# Patient Record
Sex: MALE | Race: WHITE | NOT HISPANIC OR LATINO | Employment: UNEMPLOYED | URBAN - METROPOLITAN AREA
[De-identification: names, ages, dates, MRNs, and addresses within clinical notes are randomized per-mention and may not be internally consistent; named-entity substitution may affect disease eponyms.]

---

## 2017-05-12 ENCOUNTER — HOSPITAL ENCOUNTER (EMERGENCY)
Facility: HOSPITAL | Age: 32
Discharge: HOME | End: 2017-05-12

## 2017-05-12 DIAGNOSIS — G89.29 CHRONIC BACK PAIN: ICD-10-CM

## 2017-05-12 DIAGNOSIS — M54.9 CHRONIC BACK PAIN: ICD-10-CM

## 2017-05-12 DIAGNOSIS — Z53.29 LEFT AGAINST MEDICAL ADVICE: Primary | ICD-10-CM

## 2017-05-12 DIAGNOSIS — M54.2 NECK PAIN: ICD-10-CM

## 2017-05-12 PROCEDURE — 99283 EMERGENCY DEPT VISIT LOW MDM: CPT

## 2017-11-07 ENCOUNTER — APPOINTMENT (EMERGENCY)
Dept: RADIOLOGY | Facility: HOSPITAL | Age: 32
End: 2017-11-07
Payer: COMMERCIAL

## 2017-11-07 ENCOUNTER — HOSPITAL ENCOUNTER (EMERGENCY)
Facility: HOSPITAL | Age: 32
Discharge: HOME | End: 2017-11-07
Attending: EMERGENCY MEDICINE
Payer: COMMERCIAL

## 2017-11-07 ENCOUNTER — APPOINTMENT (EMERGENCY)
Dept: CT IMAGING | Facility: HOSPITAL | Age: 32
End: 2017-11-07
Payer: COMMERCIAL

## 2017-11-07 VITALS
TEMPERATURE: 98 F | RESPIRATION RATE: 18 BRPM | WEIGHT: 195 LBS | HEIGHT: 72 IN | SYSTOLIC BLOOD PRESSURE: 164 MMHG | OXYGEN SATURATION: 100 % | DIASTOLIC BLOOD PRESSURE: 98 MMHG | BODY MASS INDEX: 26.41 KG/M2 | HEART RATE: 93 BPM

## 2017-11-07 DIAGNOSIS — R10.9 ABDOMINAL PAIN: Primary | ICD-10-CM

## 2017-11-07 DIAGNOSIS — R07.89 ATYPICAL CHEST PAIN: ICD-10-CM

## 2017-11-07 DIAGNOSIS — K59.00 CONSTIPATION: ICD-10-CM

## 2017-11-07 DIAGNOSIS — M79.18 MUSCULOSKELETAL PAIN: ICD-10-CM

## 2017-11-07 LAB
ALBUMIN SERPL BCP-MCNC: 3.8 G/DL (ref 3.5–5)
ALP SERPL-CCNC: 79 U/L (ref 46–116)
ALT SERPL W P-5'-P-CCNC: 28 U/L (ref 12–78)
AMPHETAMINES SERPL QL SCN: POSITIVE
ANION GAP BLD CALC-SCNC: 16 MMOL/L (ref 4–13)
ANION GAP SERPL CALCULATED.3IONS-SCNC: 8 MMOL/L (ref 4–13)
APAP SERPL-MCNC: <2 UG/ML (ref 10–30)
APTT PPP: 33 SECONDS (ref 23–35)
AST SERPL W P-5'-P-CCNC: 19 U/L (ref 5–45)
BARBITURATES UR QL: NEGATIVE
BASOPHILS # BLD AUTO: 0.07 THOUSANDS/ΜL (ref 0–0.1)
BASOPHILS NFR BLD AUTO: 1 % (ref 0–1)
BENZODIAZ UR QL: NEGATIVE
BILIRUB SERPL-MCNC: 0.3 MG/DL (ref 0.2–1)
BUN BLD-MCNC: 10 MG/DL (ref 5–25)
BUN SERPL-MCNC: 11 MG/DL (ref 5–25)
CA-I BLD-SCNC: 1.29 MMOL/L (ref 1.12–1.32)
CALCIUM SERPL-MCNC: 9.9 MG/DL (ref 8.3–10.1)
CHLORIDE BLD-SCNC: 103 MMOL/L (ref 100–108)
CHLORIDE SERPL-SCNC: 104 MMOL/L (ref 100–108)
CLARITY, POC: NORMAL
CO2 SERPL-SCNC: 29 MMOL/L (ref 21–32)
COCAINE UR QL: NEGATIVE
COLOR, POC: YELLOW
CREAT BLD-MCNC: 1 MG/DL (ref 0.6–1.3)
CREAT SERPL-MCNC: 1.07 MG/DL (ref 0.6–1.3)
EOSINOPHIL # BLD AUTO: 0.13 THOUSAND/ΜL (ref 0–0.61)
EOSINOPHIL NFR BLD AUTO: 1 % (ref 0–6)
ERYTHROCYTE [DISTWIDTH] IN BLOOD BY AUTOMATED COUNT: 13.3 % (ref 11.6–15.1)
ETHANOL SERPL-MCNC: <3 MG/DL (ref 0–3)
EXT BILIRUBIN, UA: NEGATIVE
EXT BLOOD URINE: NEGATIVE
EXT GLUCOSE, UA: NEGATIVE
EXT KETONES: NEGATIVE
EXT NITRITE, UA: NEGATIVE
EXT PH, UA: 8.5
EXT PROTEIN, UA: NEGATIVE
EXT SPECIFIC GRAVITY, UA: 1
EXT UROBILINOGEN: 0.2
GFR SERPL CREATININE-BSD FRML MDRD: 90 ML/MIN/1.73SQ M
GFR SERPL CREATININE-BSD FRML MDRD: 98 ML/MIN/1.73SQ M
GLUCOSE SERPL-MCNC: 89 MG/DL (ref 65–140)
GLUCOSE SERPL-MCNC: 92 MG/DL (ref 65–140)
HCT VFR BLD AUTO: 42.2 % (ref 36.5–49.3)
HCT VFR BLD CALC: 44 % (ref 36.5–49.3)
HGB BLD-MCNC: 14.1 G/DL (ref 12–17)
HGB BLDA-MCNC: 15 G/DL (ref 12–17)
INR PPP: 0.98 (ref 0.86–1.16)
LIPASE SERPL-CCNC: 92 U/L (ref 73–393)
LYMPHOCYTES # BLD AUTO: 2.15 THOUSANDS/ΜL (ref 0.6–4.47)
LYMPHOCYTES NFR BLD AUTO: 19 % (ref 14–44)
MCH RBC QN AUTO: 27.8 PG (ref 26.8–34.3)
MCHC RBC AUTO-ENTMCNC: 33.4 G/DL (ref 31.4–37.4)
MCV RBC AUTO: 83 FL (ref 82–98)
METHADONE UR QL: NEGATIVE
MONOCYTES # BLD AUTO: 0.81 THOUSAND/ΜL (ref 0.17–1.22)
MONOCYTES NFR BLD AUTO: 7 % (ref 4–12)
NEUTROPHILS # BLD AUTO: 8.41 THOUSANDS/ΜL (ref 1.85–7.62)
NEUTS SEG NFR BLD AUTO: 72 % (ref 43–75)
OPIATES UR QL SCN: POSITIVE
PCO2 BLD: 28 MMOL/L (ref 21–32)
PCP UR QL: NEGATIVE
PLATELET # BLD AUTO: 399 THOUSANDS/UL (ref 149–390)
PMV BLD AUTO: 10.1 FL (ref 8.9–12.7)
POTASSIUM BLD-SCNC: 3.9 MMOL/L (ref 3.5–5.3)
POTASSIUM SERPL-SCNC: 4.4 MMOL/L (ref 3.5–5.3)
PROT SERPL-MCNC: 7.8 G/DL (ref 6.4–8.2)
PROTHROMBIN TIME: 12.8 SECONDS (ref 12.1–14.4)
RBC # BLD AUTO: 5.07 MILLION/UL (ref 3.88–5.62)
SALICYLATES SERPL-MCNC: <3 MG/DL (ref 3–20)
SODIUM BLD-SCNC: 142 MMOL/L (ref 136–145)
SODIUM SERPL-SCNC: 141 MMOL/L (ref 136–145)
SPECIMEN SOURCE: ABNORMAL
SPECIMEN SOURCE: NORMAL
SPECIMEN SOURCE: NORMAL
THC UR QL: POSITIVE
TROPONIN I BLD-MCNC: 0 NG/ML (ref 0–0.08)
TROPONIN I BLD-MCNC: 0 NG/ML (ref 0–0.08)
WBC # BLD AUTO: 11.57 THOUSAND/UL (ref 4.31–10.16)
WBC # BLD EST: NEGATIVE 10*3/UL

## 2017-11-07 PROCEDURE — 71010 HB CHEST X-RAY 1 VIEW FRONTAL (PORTABLE): CPT

## 2017-11-07 PROCEDURE — 96361 HYDRATE IV INFUSION ADD-ON: CPT

## 2017-11-07 PROCEDURE — 80307 DRUG TEST PRSMV CHEM ANLYZR: CPT | Performed by: EMERGENCY MEDICINE

## 2017-11-07 PROCEDURE — 84484 ASSAY OF TROPONIN QUANT: CPT

## 2017-11-07 PROCEDURE — 74177 CT ABD & PELVIS W/CONTRAST: CPT

## 2017-11-07 PROCEDURE — 96360 HYDRATION IV INFUSION INIT: CPT

## 2017-11-07 PROCEDURE — 85014 HEMATOCRIT: CPT

## 2017-11-07 PROCEDURE — 85025 COMPLETE CBC W/AUTO DIFF WBC: CPT | Performed by: EMERGENCY MEDICINE

## 2017-11-07 PROCEDURE — 36415 COLL VENOUS BLD VENIPUNCTURE: CPT | Performed by: EMERGENCY MEDICINE

## 2017-11-07 PROCEDURE — 80320 DRUG SCREEN QUANTALCOHOLS: CPT | Performed by: EMERGENCY MEDICINE

## 2017-11-07 PROCEDURE — 83690 ASSAY OF LIPASE: CPT | Performed by: EMERGENCY MEDICINE

## 2017-11-07 PROCEDURE — 85730 THROMBOPLASTIN TIME PARTIAL: CPT | Performed by: EMERGENCY MEDICINE

## 2017-11-07 PROCEDURE — 80053 COMPREHEN METABOLIC PANEL: CPT | Performed by: EMERGENCY MEDICINE

## 2017-11-07 PROCEDURE — 93005 ELECTROCARDIOGRAM TRACING: CPT | Performed by: EMERGENCY MEDICINE

## 2017-11-07 PROCEDURE — 81002 URINALYSIS NONAUTO W/O SCOPE: CPT | Performed by: EMERGENCY MEDICINE

## 2017-11-07 PROCEDURE — 80329 ANALGESICS NON-OPIOID 1 OR 2: CPT | Performed by: EMERGENCY MEDICINE

## 2017-11-07 PROCEDURE — 80047 BASIC METABLC PNL IONIZED CA: CPT

## 2017-11-07 PROCEDURE — 85610 PROTHROMBIN TIME: CPT | Performed by: EMERGENCY MEDICINE

## 2017-11-07 PROCEDURE — 99285 EMERGENCY DEPT VISIT HI MDM: CPT

## 2017-11-07 RX ORDER — ALPRAZOLAM 1 MG/1
TABLET ORAL
COMMUNITY

## 2017-11-07 RX ORDER — QUETIAPINE FUMARATE 400 MG/1
400 TABLET, FILM COATED ORAL
COMMUNITY

## 2017-11-07 RX ORDER — CLONAZEPAM 1 MG/1
0.5 TABLET ORAL 2 TIMES DAILY
COMMUNITY

## 2017-11-07 RX ORDER — BUPRENORPHINE HYDROCHLORIDE AND NALOXONE HYDROCHLORIDE DIHYDRATE 2; .5 MG/1; MG/1
1 TABLET SUBLINGUAL DAILY
COMMUNITY

## 2017-11-07 RX ORDER — ACETAMINOPHEN 325 MG/1
650 TABLET ORAL ONCE
Status: COMPLETED | OUTPATIENT
Start: 2017-11-07 | End: 2017-11-07

## 2017-11-07 RX ADMIN — ACETAMINOPHEN 650 MG: 325 TABLET ORAL at 03:22

## 2017-11-07 RX ADMIN — SODIUM CHLORIDE 1000 ML: 0.9 INJECTION, SOLUTION INTRAVENOUS at 02:53

## 2017-11-07 RX ADMIN — IOHEXOL 100 ML: 350 INJECTION, SOLUTION INTRAVENOUS at 02:37

## 2017-11-07 RX ADMIN — SODIUM CHLORIDE 1000 ML: 0.9 INJECTION, SOLUTION INTRAVENOUS at 01:56

## 2017-11-07 NOTE — ED PROVIDER NOTES
History  Chief Complaint   Patient presents with    Abdominal Pain     pt presents to ER from police station with abdominal pain, arm pain and chest pain  pt states started about an hour ago      This is 25-year-old male who presents via ambulance for evaluation of multiple complaints including generalized abdominal pain chest pain left arm pain and shortness of breath that started over the past 1 hour while he was in police custody for the past 6 hours awaiting trial this morning  Patient states there is a family history of cardiac disease and he has hypertension that he is not being treated for current lead denies any smoking does not have any known diabetes or hypercholesterolemia he states that he did use methamphetamine just yesterday and he is currently on Suboxone and Xanax  He also has a chronic history of neck and back pain and complains of pain in his left arm left chest abdomen and left leg  Pain is described as sharp and constant in all areas        History provided by:  Patient and EMS personnel  Abdominal Pain   Pain location:  Generalized  Pain quality: aching and sharp    Pain severity:  Moderate  Onset quality:  Sudden  Duration:  1 hour  Timing:  Constant  Progression:  Unchanged  Relieved by:  Nothing  Worsened by:  Palpation  Associated symptoms: chest pain and shortness of breath        Prior to Admission Medications   Prescriptions Last Dose Informant Patient Reported? Taking? ALPRAZolam (XANAX) 1 mg tablet   Yes Yes   Sig: Take by mouth daily at bedtime as needed for anxiety   QUEtiapine (SEROquel) 400 MG tablet   Yes Yes   Sig: Take 400 mg by mouth daily at bedtime   buprenorphine-naloxone (SUBOXONE) 2-0 5 mg per SL tablet   Yes Yes   Sig: Place 1 tablet under the tongue daily   clonazePAM (KlonoPIN) 1 mg tablet   Yes Yes   Sig: Take 0 5 mg by mouth 2 (two) times a day      Facility-Administered Medications: None       History reviewed  No pertinent past medical history      History reviewed  No pertinent surgical history  History reviewed  No pertinent family history  I have reviewed and agree with the history as documented  Social History   Substance Use Topics    Smoking status: Never Smoker    Smokeless tobacco: Never Used    Alcohol use Yes      Comment: half a handle to a handle everyday         Review of Systems   Respiratory: Positive for shortness of breath  Cardiovascular: Positive for chest pain  Gastrointestinal: Positive for abdominal pain  Musculoskeletal: Positive for back pain and neck pain  All other systems reviewed and are negative  Physical Exam  ED Triage Vitals   Temperature Pulse Respirations Blood Pressure SpO2   11/07/17 0143 11/07/17 0150 11/07/17 0143 11/07/17 0150 11/07/17 0150   98 °F (36 7 °C) 89 18 (!) 158/101 99 %      Temp Source Heart Rate Source Patient Position - Orthostatic VS BP Location FiO2 (%)   11/07/17 0143 11/07/17 0143 11/07/17 0150 11/07/17 0150 --   Temporal Monitor Lying Right arm       Pain Score       11/07/17 0150       Worst Possible Pain           Orthostatic Vital Signs  Vitals:    11/07/17 0215 11/07/17 0245 11/07/17 0300 11/07/17 0345   BP: (!) 175/109 (!) 176/132 (!) 180/118 164/98   Pulse: 92 92 83 93   Patient Position - Orthostatic VS:           Physical Exam   Constitutional: He is oriented to person, place, and time  He appears well-developed and well-nourished  HENT:   Head: Normocephalic and atraumatic  Right Ear: External ear normal    Left Ear: External ear normal    Nose: Nose normal    Mouth/Throat: Oropharynx is clear and moist    Eyes: Conjunctivae and EOM are normal  Pupils are equal, round, and reactive to light  Neck: Normal range of motion  Neck supple  No tracheal deviation present  Cardiovascular: Normal rate, regular rhythm and intact distal pulses  Exam reveals no gallop and no friction rub  No murmur heard  Pulmonary/Chest: Effort normal and breath sounds normal  No stridor   No respiratory distress  He has no wheezes  He has no rales  Abdominal: Soft  Bowel sounds are normal  He exhibits no distension  Mass:  voluntary  There is tenderness ( generalized)  There is guarding  There is no rebound  Musculoskeletal: Normal range of motion  He exhibits no edema, tenderness or deformity  Neurological: He is oriented to person, place, and time  No cranial nerve deficit or sensory deficit  He exhibits normal muscle tone  Skin: Skin is warm and dry  No rash noted  He is not diaphoretic  Psychiatric:   Slow  To answer keeping eyes closed   Nursing note and vitals reviewed  ED Medications  Medications   sodium chloride 0 9 % bolus 1,000 mL (1,000 mL Intravenous New Bag 11/7/17 0253)   sodium chloride 0 9 % bolus 1,000 mL (0 mL Intravenous Stopped 11/7/17 0253)   iohexol (OMNIPAQUE) 350 MG/ML injection (SINGLE-DOSE) 100 mL (100 mL Intravenous Given 11/7/17 0237)   acetaminophen (TYLENOL) tablet 650 mg (650 mg Oral Given 11/7/17 0322)       Diagnostic Studies  Results Reviewed     Procedure Component Value Units Date/Time    Rapid drug screen, urine [[id number]]  (Abnormal) Collected:  11/07/17 0256    Lab Status:  Final result Specimen:  Urine from Urine, Clean Catch Updated:  11/07/17 0350     Amph/Meth UR Positive (A)     Barbiturate Ur Negative     Benzodiazepine Urine Negative     Cocaine Urine Negative     Methadone Urine Negative     Opiate Urine Positive (A)     PCP Ur Negative     THC Urine Positive (A)    Narrative:         Presumptive report  If requested, specimen will be sent to reference lab for confirmation  FOR MEDICAL PURPOSES ONLY  IF CONFIRMATION NEEDED PLEASE CONTACT THE LAB WITHIN 5 DAYS      Drug Screen Cutoff Levels:  AMPHETAMINE/METHAMPHETAMINES  1000 ng/mL  BARBITURATES     200 ng/mL  BENZODIAZEPINES     200 ng/mL  COCAINE      300 ng/mL  METHADONE      300 ng/mL  OPIATES      300 ng/mL  PHENCYCLIDINE     25 ng/mL  THC       50 ng/mL    POCT urinalysis dipstick [[id number]]  (Normal) Resulted:  11/07/17 0300    Lab Status:  Final result Specimen:  Urine Updated:  11/07/17 0301     Color, UA yellow     Clarity, UA Hazy     EXT Glucose, UA Negative     EXT Bilirubin, UA (Ref: Negative) Negative     EXT Ketones, UA (Ref: Negative) Negative     EXT Spec Grav, UA 1 005     EXT Blood, UA (Ref: Negative) Negative     EXT pH, UA 8 5     EXT Protein, UA (Ref: Negative) Negative     EXT Urobilinogen, UA (Ref: 0 2- 1 0) 0 2     EXT Leukocytes, UA (Ref: Negative) Negative     EXT Nitrite, UA (Ref: Negative) Negative    Ethanol [[id number]]  (Normal) Collected:  11/07/17 0156    Lab Status:  Final result Specimen:  Blood from Arm, Left Updated:  11/07/17 0232     Ethanol Lvl <3 mg/dL     Acetaminophen level [[id number]]  (Abnormal) Collected:  11/07/17 0156    Lab Status:  Final result Specimen:  Blood from Arm, Left Updated:  11/07/17 0231     Acetaminophen Level <2 (L) ug/mL     Comprehensive metabolic panel [[id number]] Collected:  11/07/17 0156    Lab Status:  Final result Specimen:  Blood from Arm, Left Updated:  11/07/17 4893     Sodium 141 mmol/L      Potassium 4 4 mmol/L      Chloride 104 mmol/L      CO2 29 mmol/L      Anion Gap 8 mmol/L      BUN 11 mg/dL      Creatinine 1 07 mg/dL      Glucose 89 mg/dL      Calcium 9 9 mg/dL      AST 19 U/L      ALT 28 U/L      Alkaline Phosphatase 79 U/L      Total Protein 7 8 g/dL      Albumin 3 8 g/dL      Total Bilirubin 0 30 mg/dL      eGFR 90 ml/min/1 73sq m     Narrative:         National Kidney Disease Education Program recommendations are as follows:  GFR calculation is accurate only with a steady state creatinine  Chronic Kidney disease less than 60 ml/min/1 73 sq  meters  Kidney failure less than 15 ml/min/1 73 sq  meters      Maude Pardo [[id number]]  (Normal) Collected:  11/07/17 0156    Lab Status:  Final result Specimen:  Blood from Arm, Left Updated:  11/07/17 0229     Protime 12 8 seconds      INR 0 98    APTT [[id number]]  (Normal) Collected:  11/07/17 0156    Lab Status:  Final result Specimen:  Blood from Arm, Left Updated:  11/07/17 0229     PTT 33 seconds     Narrative: Therapeutic Heparin Range = 60-90 seconds    Lipase [[id number]]  (Normal) Collected:  11/07/17 0156    Lab Status:  Final result Specimen:  Blood from Arm, Left Updated:  11/07/17 0229     Lipase 92 u/L     Salicylate level [[id number]]  (Abnormal) Collected:  11/07/17 0156    Lab Status:  Final result Specimen:  Blood from Arm, Left Updated:  12/42/35 3036     Salicylate Lvl <3 (L) mg/dL     POCT troponin [[id number]]  (Normal) Collected:  11/07/17 0159    Lab Status:  Final result Updated:  11/07/17 0213     POC Troponin I 0 00 ng/ml      Specimen Type VENOUS    Narrative:         [employer] i-Stat handheld analyzer 99% cutoff is > 0 08ng/mL in [facility] Emergency Departments    o cTnI 99% cutoff is useful only when applied to patients in the clinical setting of myocardial ischemia  o cTnI 99% cutoff should be interpreted in the context of clinical history, ECG findings and possibly cardiac imaging to establish correct diagnosis  o cTnI 99% cutoff may be suggestive but clearly not indicative of a coronary event without the clinical setting of myocardial ischemia      POCT Chem 8+ [[id number]]  (Abnormal) Collected:  11/07/17 0158    Lab Status:  Final result Updated:  11/07/17 0213     SODIUM, I-STAT 142 mmol/l      Potassium, i-STAT 3 9 mmol/L      Chloride, istat 103 mmol/L      CO2, i-STAT 28 mmol/L      Anion Gap, Istat 16 (H) mmol/L      Calcium, Ionized i-STAT 1 29 mmol/L      BUN, I-STAT 10 mg/dl      Creatinine, i-STAT 1 0 mg/dl      eGFR 98 ml/min/1 73sq m      Glucose, i-STAT 92 mg/dl      Hct, i-STAT 44 %      Hgb, i-STAT 15 0 g/dl      Specimen Type VENOUS    CBC and differential [[id number]]  (Abnormal) Collected:  11/07/17 0156    Lab Status:  Final result Specimen:  Blood from Arm, Left Updated:  11/07/17 0210     WBC 11 57 (H) Thousand/uL      RBC 5 07 Million/uL Hemoglobin 14 1 g/dL      Hematocrit 42 2 %      MCV 83 fL      MCH 27 8 pg      MCHC 33 4 g/dL      RDW 13 3 %      MPV 10 1 fL      Platelets 949 (H) Thousands/uL      Neutrophils Relative 72 %      Lymphocytes Relative 19 %      Monocytes Relative 7 %      Eosinophils Relative 1 %      Basophils Relative 1 %      Neutrophils Absolute 8 41 (H) Thousands/µL      Lymphocytes Absolute 2 15 Thousands/µL      Monocytes Absolute 0 81 Thousand/µL      Eosinophils Absolute 0 13 Thousand/µL      Basophils Absolute 0 07 Thousands/µL                  CT abdomen pelvis with contrast   ED Interpretation by Bill Keane DO (11/07 3900)   Virtual Radiology reading of the CT scan is consistent with moderate amount of stool within the colon no other acute findings      XR chest 1 view portable   ED Interpretation by Bill Keane DO (11/07 2509)   Chest x-ray does not show any acute infiltrate or cardiomegaly                 Procedures  ECG 12 Lead Documentation  Date/Time: 11/7/2017 2:00 AM  Performed by: Lainey Coker  Authorized by: Lainey Coker     ECG reviewed by me, the ED Provider: yes    Patient location:  ED  Rhythm:     Rhythm: sinus rhythm    T waves:     T waves: normal    Comments:       No acute injury           Phone Contacts  ED Phone Contact    ED Course  ED Course as of Nov 07 0351   Tue Nov 07, 2017   0350  Repeat troponin and EKG are unremarkable          HEART Risk Score    Flowsheet Row Most Recent Value   History  0 Filed at: 11/07/2017 0218   ECG  0 Filed at: 11/07/2017 7725   Age  0 Filed at: 11/07/2017 2666   Risk Factors  1 Filed at: 11/07/2017 0218   Troponin  0 Filed at: 11/07/2017 0218   Heart Score Risk Calculator   History  0 Filed at: 11/07/2017 0218   ECG  0 Filed at: 11/07/2017 5635   Age  0 Filed at: 11/07/2017 5083   Risk Factors  1 Filed at: 11/07/2017 0218   Troponin  0 Filed at: 11/07/2017 6199   HEART Score  1 Filed at: 11/07/2017 5686   HEART Score  1 Filed at: 11/07/2017 [address] [facility] [facility] for PE    Flowsheet Row Most Recent Value   PERC Rule for PE   Age >=50  0 Filed at: 11/07/2017 0220   HR >=100  0 Filed at: 11/07/2017 0220   O2 Sat on room air < 95%  0 Filed at: 11/07/2017 0220   History of PE or DVT  0 Filed at: 11/07/2017 4477   Recent trauma or surgery  0 Filed at: 11/07/2017 0220   Hemoptysis  0 Filed at: 11/07/2017 0220   Exogenous estrogen  0 Filed at: 11/07/2017 0220   Unilateral leg swelling  0 Filed at: 11/07/2017 0220   PERC Rule for PE Results  0 Filed at: 11/07/2017 0293                Wells' Criteria for PE    Flowsheet Row Most Recent Value   Wells' Criteria for PE   Clinical signs and symptoms of DVT  0 Filed at: 11/07/2017 8723   PE is primary diagnosis or equally likely  0 Filed at: 11/07/2017 0219   HR >100  0 Filed at: 11/07/2017 6223   Immobilization at least 3 days or Surgery in the previous 4 weeks  0 Filed at: 11/07/2017 4504   Previous, objectively diagnosed PE or DVT  0 Filed at: 11/07/2017 2902   Hemoptysis  0 Filed at: 11/07/2017 4588   Malignancy with treatment within 6 months or palliative  0 Filed at: 11/07/2017 4538   Wells' Criteria Total  0 Filed at: 11/07/2017 0219            MDM  Number of Diagnoses or Management Options  Diagnosis management comments:  Atypical chest pain most likely musculoskeletal will check EKG and troponin to evaluate for possible cardiac etiology and chest x-ray to evaluate for pulmonary etiology such as pneumonia or pneumothorax also abdominal pain will check CT scan labs and urinalysis for further evaluation         Amount and/or Complexity of Data Reviewed  Clinical lab tests: ordered  Tests in the radiology section of CPT®: ordered      CritCare Time    Disposition  Final diagnoses:   Abdominal pain   Atypical chest pain   Musculoskeletal pain   Constipation     Time reflects when diagnosis was documented in both MDM as applicable and the Disposition within this note     Time User Action Codes Description Comment    11/7/2017  2:25 AM Almeta Freitas Add [R10 9] Abdominal pain     11/7/2017  2:25 AM Almeta Freitas Add [R07 89] Atypical chest pain     11/7/2017  2:25 AM Almeta Freitas Add [M79 1] Musculoskeletal pain     11/7/2017  3:19 AM Almeta Freitas Add [K59 00] Constipation       ED Disposition     ED Disposition Condition Comment    Discharge  [address] [facility] discharge to home/self care  Condition at discharge: Stable        Follow-up Information     Follow up With Specialties Details Why MD Amy  In 1 week  [address] [facility] [address]  [phone]          Patient's Medications   Discharge Prescriptions    No medications on file     No discharge procedures on file      ED Provider  Electronically Signed by           Evangelist More DO  11/07/17 [address] [facility] [place],   11/07/17 7222

## 2017-11-07 NOTE — DISCHARGE INSTRUCTIONS
Constipation   WHAT YOU NEED TO KNOW:   What is constipation? Constipation is when you have hard, dry bowel movements, or you go longer than usual between bowel movements  What causes constipation? · Not enough water or high-fiber foods    · Lack of physical activity    · Medicine used to treat pain, depression, or high blood pressure    · Medical conditions, such as hemorrhoids, diabetes, or a stroke  What are the signs and symptoms of constipation? · Difficulty pushing out your bowel movement    · Pain or bleeding during your bowel movement    · A feeling that you did not finish having your bowel movement    · Nausea    · Bloating    · Headache  How is constipation treated? Medicine or a fiber supplement may help make your bowel movement softer  A laxative may help relax and loosen your intestines to help you have a bowel movement  You may also be given medicine to increase fluid in your intestines  The fluid may help move bowel movements through your intestines  How can I manage my symptoms? · Drink liquids as directed  You may need to drink extra liquids to help soften and move your bowels  Ask how much liquid to drink each day and which liquids are best for you  · Eat high-fiber foods  This may help decrease constipation by adding bulk to your bowel movements  High-fiber foods include fruit, vegetables, whole-grain breads and cereals, and beans  Your healthcare provider or dietitian can help you create a high-fiber meal plan  · Exercise regularly  Regular physical activity can help stimulate your intestines  Ask which exercises are best for you  · Schedule a time each day to have a bowel movement  This may help train your body to have regular bowel movements  Bend forward while you are on the toilet to help move the bowel movement out  Sit on the toilet for at least 10 minutes, even if you do not have a bowel movement  When should I seek immediate care?    · You have blood in your bowel movements  · You have a fever and abdominal pain with constipation  When should I contact my healthcare provider? · Your constipation gets worse  · You start to vomit  · You have questions or concerns about your condition or care  CARE AGREEMENT:   You have the right to help plan your care  Learn about your health condition and how it may be treated  Discuss treatment options with your caregivers to decide what care you want to receive  You always have the right to refuse treatment  The above information is an  only  It is not intended as medical advice for individual conditions or treatments  Talk to your doctor, nurse or pharmacist before following any medical regimen to see if it is safe and effective for you  © 2017 [address]  Information is for End User's use only and may not be sold, redistributed or otherwise used for commercial purposes  All illustrations and images included in CareNotes® are the copyrighted property of [employer]  or Barrett Palomino  Abdominal Pain, Ambulatory Care   GENERAL INFORMATION:   Abdominal pain  can be dull, achy, or sharp  You may have pain in one area of your abdomen, or in your entire abdomen  Your pain may be caused by constipation, food sensitivity or poisoning, infection, or a blockage  Abdominal pain can also be caused by a hernia, appendicitis, or an ulcer  The cause of your abdominal pain may be unknown     Seek immediate care for the following symptoms:   · New chest pain or shortness of breath    · Pulsing pain in your upper abdomen or lower back that suddenly becomes constant    · Pain in the right lower abdominal area that worsens with movement    · Fever over 100 4°F (38°C) or shaking chills    · Vomiting and you cannot keep food or fluids down    · Pain does not improve or gets worse over the next 8 to 12 hours    · Blood in your vomit or bowel movements, or they look black and tarry    · Skin or the whites of your eyes turn yellow    · Large amount of vaginal bleeding that is not your monthly period  Treatment for abdominal pain  may include medicine to calm your stomach, prevent vomiting, or decrease pain  Follow up with your healthcare provider as directed:  Write down your questions so you remember to ask them during your visits  CARE AGREEMENT:   You have the right to help plan your care  Learn about your health condition and how it may be treated  Discuss treatment options with your caregivers to decide what care you want to receive  You always have the right to refuse treatment  The above information is an  only  It is not intended as medical advice for individual conditions or treatments  Talk to your doctor, nurse or pharmacist before following any medical regimen to see if it is safe and effective for you  © 2014 [address] is for End User's use only and may not be sold, redistributed or otherwise used for commercial purposes  All illustrations and images included in CareNotes® are the copyrighted property of A D A M [employer] Inc  or [facility]  Chest Pain   WHAT YOU NEED TO KNOW:   Chest pain can be caused by a range of conditions, from not serious to life-threatening  Chest pain can be a symptom of a digestive problem, such as acid reflux or a stomach ulcer  An anxiety attack or a strong emotion, such as anger, can also cause chest pain  Infection, inflammation, or a fracture in the bones or cartilage in your chest can cause pain or discomfort  Sometimes chest pain or pressure is caused by poor blood flow to your heart (angina)  Chest pain may also be caused by life-threatening conditions such as a heart attack or blood clot in your lungs     DISCHARGE INSTRUCTIONS:   Call 911 if:   · You have any of the following signs of a heart attack:      ¨ Squeezing, pressure, or pain in your chest that lasts longer than 5 minutes or returns    ¨ Discomfort or pain in your back, neck, jaw, stomach, or arm     ¨ Trouble breathing    ¨ Nausea or vomiting    ¨ Lightheadedness or a sudden cold sweat, especially with chest pain or trouble breathing    Seek care immediately if:   · You have chest discomfort that gets worse, even with medicine  · You cough or vomit blood  · Your bowel movements are black or bloody  · You cannot stop vomiting, or it hurts to swallow  Contact your healthcare provider if:   · You have questions or concerns about your condition or care  Medicines:   · Medicines  may be given to treat the cause of your chest pain  Examples include pain medicine, anxiety medicine, or medicines to increase blood flow to your heart  · Do not take certain medicines without asking your healthcare provider first   These include NSAIDs, herbal or vitamin supplements, or hormones (estrogen or progestin)  · Take your medicine as directed  Contact your healthcare provider if you think your medicine is not helping or if you have side effects  Tell him or her if you are allergic to any medicine  Keep a list of the medicines, vitamins, and herbs you take  Include the amounts, and when and why you take them  Bring the list or the pill bottles to follow-up visits  Carry your medicine list with you in case of an emergency  Follow up with your healthcare provider within 72 hours, or as directed: You may need to return for more tests to find the cause of your chest pain  You may be referred to a specialist, such as a cardiologist or gastroenterologist  Write down your questions so you remember to ask them during your visits  Healthy living tips: The following are general healthy guidelines  If your chest pain is caused by a heart problem, your healthcare provider will give you specific guidelines to follow  · Do not smoke  Nicotine and other chemicals in cigarettes and cigars can cause lung and heart damage   Ask your healthcare provider for information if you currently smoke and need help to quit  E-cigarettes or smokeless tobacco still contain nicotine  Talk to your healthcare provider before you use these products  · Eat a variety of healthy, low-fat foods  Healthy foods include fruits, vegetables, whole-grain breads, low-fat dairy products, beans, lean meats, and fish  Ask for more information about a heart healthy diet  · Ask about activity  Your healthcare provider will tell you which activities to limit or avoid  Ask when you can drive, return to work, and have sex  Ask about the best exercise plan for you  · Maintain a healthy weight  Ask your healthcare provider how much you should weigh  Ask him or her to help you create a weight loss plan if you are overweight  © 2017 [address] [facility] Information is for End User's use only and may not be sold, redistributed or otherwise used for commercial purposes  All illustrations and images included in CareNotes® are the copyrighted property of A D A M [employer]  or [employer] Palomino  The above information is an  only  It is not intended as medical advice for individual conditions or treatments  Talk to your doctor, nurse or pharmacist before following any medical regimen to see if it is safe and effective for you  Musculoskeletal Pain   WHAT YOU NEED TO KNOW:   Muscle pain can be dull, achy, or sharp  You may have pain and tenderness to the touch as well  It can occur anywhere on your body and is often brought on by exercise  Muscle pain may occur from an injury, such as a sprain, tendonitis, or bone fracture  Muscle pain can also be the result of medical conditions, such as polymyositis, fibromyalgia, and connective tissue disorders  DISCHARGE INSTRUCTIONS:   Self care:   · Rest  as directed and avoid activity that causes pain  You may be able to return to normal activity when you can move without pain  Follow directions for rest and activity  You are at risk for injury for 3 weeks after your symptoms go away  · Ice  your painful muscle area to decrease pain and swelling  Use an ice pack, or put ice in a plastic bag and cover it with a towel  Always  put a cloth between the ice and your skin  Apply the ice as often as directed for the first 24 to 48 hours  · Compression  with a splint, brace, or elastic bandage helps decrease pain and swelling  This may be needed for muscle pain in arms or legs  A splint, brace, or bandage will also help protect the painful area when you move around  · Elevate  a painful arm or leg to reduce swelling and pain  Elevate your limb while you are sitting or lying down  Prop a painful leg on pillows to keep it above the level of your heart  Medicines:   · NSAIDs  help decrease swelling and pain or fever  This medicine is available with or without a doctor's order  NSAIDs can cause stomach bleeding or kidney problems in certain people  If you take blood thinner medicine, always ask your healthcare provider if NSAIDs are safe for you  Always read the medicine label and follow directions  · Acetaminophen  is used to decrease pain  It is available without a doctor's order  Ask your healthcare provider how much to take and when to take it  Follow directions  Acetaminophen can cause liver damage if not taken correctly  Do not take more than one medicine that contains acetaminophen unless directed  · Muscle relaxers  help relax your muscles to decrease pain and muscle spasms  · Steroids  may be given to decrease redness, pain, and swelling  · Take your medicine as directed  Contact your healthcare provider if you think your medicine is not helping or if you have side effects  Tell him if you are allergic to any medicine  Keep a list of the medicines, vitamins, and herbs you take  Include the amounts, and when and why you take them  Bring the list or the pill bottles to follow-up visits   Carry your medicine list with you in case of an emergency  Follow up with your healthcare provider as directed: You may need more tests to help healthcare providers find the cause of your muscle pain  You may need physical therapy to learn muscle strengthening exercises  Write down your questions so you remember to ask them during your visits  Contact your healthcare provider if:   · You have a fever  · You have trouble sleeping because of your pain  · Your painful area becomes more tender, red, and warm to the touch  · You have decreased movement of the painful area  · You have questions or concerns about your condition or care  Return to the emergency department if:   · You have increased severe pain when you move the painful muscle area  · You lose feeling in your painful muscle area  · You have new or worse swelling in the painful area  Your skin may feel tight  · You have increased muscle pain or pain that does not improve with treatment  © 2017 [address] Information is for End User's use only and may not be sold, redistributed or otherwise used for commercial purposes  All illustrations and images included in CareNotes® are the copyrighted property of [employer]  or Barrett Palomino  The above information is an  only  It is not intended as medical advice for individual conditions or treatments  Talk to your doctor, nurse or pharmacist before following any medical regimen to see if it is safe and effective for you